# Patient Record
Sex: FEMALE | Race: WHITE | NOT HISPANIC OR LATINO | Employment: FULL TIME | RURAL
[De-identification: names, ages, dates, MRNs, and addresses within clinical notes are randomized per-mention and may not be internally consistent; named-entity substitution may affect disease eponyms.]

---

## 2022-06-28 ENCOUNTER — OFFICE VISIT (OUTPATIENT)
Dept: FAMILY MEDICINE CLINIC | Facility: CLINIC | Age: 37
End: 2022-06-28

## 2022-06-28 VITALS
HEIGHT: 63 IN | BODY MASS INDEX: 27.82 KG/M2 | DIASTOLIC BLOOD PRESSURE: 70 MMHG | WEIGHT: 157 LBS | SYSTOLIC BLOOD PRESSURE: 124 MMHG

## 2022-06-28 DIAGNOSIS — J01.10 ACUTE NON-RECURRENT FRONTAL SINUSITIS: Primary | ICD-10-CM

## 2022-06-28 LAB
EXPIRATION DATE: NORMAL
INTERNAL CONTROL: NORMAL
Lab: NORMAL
SARS-COV-2 AG UPPER RESP QL IA.RAPID: NOT DETECTED

## 2022-06-28 PROCEDURE — 87426 SARSCOV CORONAVIRUS AG IA: CPT | Performed by: STUDENT IN AN ORGANIZED HEALTH CARE EDUCATION/TRAINING PROGRAM

## 2022-06-28 PROCEDURE — 99203 OFFICE O/P NEW LOW 30 MIN: CPT | Performed by: STUDENT IN AN ORGANIZED HEALTH CARE EDUCATION/TRAINING PROGRAM

## 2022-06-28 RX ORDER — CEFDINIR 300 MG/1
300 CAPSULE ORAL 2 TIMES DAILY
Qty: 14 CAPSULE | Refills: 0 | Status: SHIPPED | OUTPATIENT
Start: 2022-06-28 | End: 2022-12-22

## 2022-06-28 NOTE — PROGRESS NOTES
"Chief Complaint  Earache (X6 days)    Subjective          April Jade Bonner presents to [facility] PRIMARY [facility]  URI   This is a new problem. The current episode started in the past 7 days. The problem has been gradually worsening. There has been no fever. Associated symptoms include congestion, ear pain, headaches, a plugged ear sensation, sinus pain and a sore throat. She has tried nothing for the symptoms. The treatment provided no relief.       Objective   Vital Signs:   /70 (BP Location: Left arm, Patient Position: Sitting, Cuff Size: Large Adult)   Ht 160 cm (63\")   Wt 71.2 kg (157 lb)   BMI 27.81 kg/m²     Body mass index is 27.81 kg/m².    Review of Systems   HENT: Positive for congestion, ear pain and sore throat.        Past History:  Medical History: has a past medical history of Anemia, Anxiety, and Neurotic depression.   Surgical History: has a past surgical history that includes Hernia repair; Cosmetic surgery; and  section.   Family History: family history includes Allergies in her father and mother; Asthma in her father and another family member; Cancer in an other family member; Depression in her mother and another family member; Diabetes in her father and another family member; Obesity in an other family member; Osteoarthritis in her father, mother, and another family member.   Social History: reports that she has never smoked. She does not have any smokeless tobacco history on file.      Current Outpatient Medications:   •  cefdinir (OMNICEF) 300 MG capsule, Take 1 capsule by mouth 2 (Two) Times a Day., Disp: 14 capsule, Rfl: 0    Allergies: Penicillins    Physical Exam  Constitutional:       General: She is not in acute distress.     Appearance: She is not ill-appearing or toxic-appearing.   HENT:      Head: Normocephalic and atraumatic.      Right Ear: Ear canal and external ear normal.      Left Ear: Ear canal and external ear normal.      Nose: " Congestion and rhinorrhea present.      Mouth/Throat:      Pharynx: Posterior oropharyngeal erythema (cobblestoning) present.   Eyes:      General: No scleral icterus.  Cardiovascular:      Rate and Rhythm: Normal rate and regular rhythm.   Pulmonary:      Effort: Pulmonary effort is normal.      Breath sounds: Normal breath sounds.   Neurological:      Mental Status: She is alert.          Result Review :                   Assessment and Plan    Diagnoses and all orders for this visit:    1. Acute non-recurrent frontal sinusitis (Primary)    Other orders  -     cefdinir (OMNICEF) 300 MG capsule; Take 1 capsule by mouth 2 (Two) Times a Day.  Dispense: 14 capsule; Refill: 0    Rapid COVID-negative.  We will treat with cefdinir.  Call with any new or worsening symptoms.  Continue Tylenol/Motrin for pain/fever. OTC cough and cold medication for symptoms. Nasal saline spray recommended. Cool mist humidifier at the bedside. RTC with new or worsening symptoms.        Follow Up   No follow-ups on file.  Patient was given instructions and counseling regarding her condition or for health maintenance advice. Please see specific information pulled into the AVS if appropriate.     Patti Loza, DO

## 2022-12-22 ENCOUNTER — OFFICE VISIT (OUTPATIENT)
Dept: FAMILY MEDICINE CLINIC | Facility: CLINIC | Age: 37
End: 2022-12-22

## 2022-12-22 VITALS
HEART RATE: 84 BPM | BODY MASS INDEX: 29.06 KG/M2 | OXYGEN SATURATION: 98 % | DIASTOLIC BLOOD PRESSURE: 66 MMHG | HEIGHT: 63 IN | SYSTOLIC BLOOD PRESSURE: 112 MMHG | WEIGHT: 164 LBS

## 2022-12-22 DIAGNOSIS — R05.1 ACUTE COUGH: ICD-10-CM

## 2022-12-22 DIAGNOSIS — J01.10 ACUTE NON-RECURRENT FRONTAL SINUSITIS: Primary | ICD-10-CM

## 2022-12-22 DIAGNOSIS — R09.89 CHEST CONGESTION: ICD-10-CM

## 2022-12-22 LAB
EXPIRATION DATE: NORMAL
EXPIRATION DATE: NORMAL
FLUAV AG UPPER RESP QL IA.RAPID: NOT DETECTED
FLUBV AG UPPER RESP QL IA.RAPID: NOT DETECTED
INTERNAL CONTROL: NORMAL
INTERNAL CONTROL: NORMAL
Lab: NORMAL
Lab: NORMAL
S PYO AG THROAT QL: NEGATIVE
SARS-COV-2 RNA RESP QL NAA+PROBE: NOT DETECTED

## 2022-12-22 PROCEDURE — 87428 SARSCOV & INF VIR A&B AG IA: CPT | Performed by: PHYSICIAN ASSISTANT

## 2022-12-22 PROCEDURE — 87880 STREP A ASSAY W/OPTIC: CPT | Performed by: PHYSICIAN ASSISTANT

## 2022-12-22 PROCEDURE — 99213 OFFICE O/P EST LOW 20 MIN: CPT | Performed by: PHYSICIAN ASSISTANT

## 2022-12-22 RX ORDER — FOLIC ACID 1 MG/1
1000 TABLET ORAL DAILY
COMMUNITY
Start: 2022-12-03

## 2022-12-22 RX ORDER — CITALOPRAM 20 MG/1
20 TABLET ORAL DAILY
COMMUNITY
Start: 2022-12-03

## 2022-12-22 RX ORDER — CEPHALEXIN 500 MG/1
500 CAPSULE ORAL 2 TIMES DAILY
Qty: 14 CAPSULE | Refills: 0 | Status: SHIPPED | OUTPATIENT
Start: 2022-12-22 | End: 2022-12-29

## 2022-12-22 RX ORDER — ALBUTEROL SULFATE 90 UG/1
2 AEROSOL, METERED RESPIRATORY (INHALATION) EVERY 4 HOURS PRN
Qty: 18 G | Refills: 1 | Status: SHIPPED | OUTPATIENT
Start: 2022-12-22

## 2022-12-22 NOTE — PROGRESS NOTES
"Chief Complaint  Cough (Cough, chest congestion been going on for a week )    Subjective        April Jade Bonner presents to [facility]  History of Present Illness  Patient states for a week she has had more cough and has been achy all over.  She states that her symptoms and improved.  She states she is no had no further nasal congestion.  She does however feel like the cough is also in her chest.  She said that hurts when she breathes.  She states she is had a lot of drainage mucus and headache.  She denies that she ever had a fever with this.  She is taking NyQuil and notes that the cough is not keeping her up at night.  She is also taking DayQuil.  She has never been diagnosed with asthma or given inhalers.      Objective   Vital Signs:  /66 (BP Location: Left arm, Patient Position: Sitting, Cuff Size: Large Adult)   Pulse 84   Ht 160 cm (63\")   Wt 74.4 kg (164 lb)   SpO2 98%   BMI 29.05 kg/m²   Estimated body mass index is 29.05 kg/m² as calculated from the following:    Height as of this encounter: 160 cm (63\").    Weight as of this encounter: 74.4 kg (164 lb).          Physical Exam  Constitutional:       Appearance: Normal appearance.   HENT:      Mouth/Throat:      Comments: Discolored drainage  Pulmonary:      Effort: Pulmonary effort is normal.      Comments: coarse  Neurological:      Mental Status: She is alert.        Result Review :     POC Rapid Strep A (12/22/2022 14:16)  Covid-19 + Flu A&B AG, Veritor (12/22/2022 14:15)              Assessment and Plan   Diagnoses and all orders for this visit:    1. Acute non-recurrent frontal sinusitis (Primary)  -     cephalexin (Keflex) 500 MG capsule; Take 1 capsule by mouth 2 (Two) Times a Day for 7 days.  Dispense: 14 capsule; Refill: 0  We will add Keflex twice a day for the next 7 days.  We encouraged her DayQuil or NyQuil to help with her symptoms.  2. Chest congestion  -     Covid-19 + Flu A&B AG, " Veritor  -     POC Rapid Strep A  We discussed with patient that her flu COVID and strep test were negative today.  3. Acute cough  -     albuterol sulfate  (90 Base) MCG/ACT inhaler; Inhale 2 puffs Every 4 (Four) Hours As Needed for Wheezing or Shortness of Air.  Dispense: 18 g; Refill: 1  We will add albuterol inhaler to use as needed for cough and heaviness in chest.  Discussed with patient that side effect of albuterol is to feel quite jittery.  She may rinse her mouth out after using albuterol to help decrease her symptoms.  If her symptoms worsen she is to give our office a call.           Follow Up   No follow-ups on file.  Patient was given instructions and counseling regarding her condition or for health maintenance advice. Please see specific information pulled into the AVS if appropriate.

## 2024-05-17 ENCOUNTER — OFFICE VISIT (OUTPATIENT)
Dept: FAMILY MEDICINE CLINIC | Facility: CLINIC | Age: 39
End: 2024-05-17
Payer: COMMERCIAL

## 2024-05-17 VITALS
HEIGHT: 63 IN | DIASTOLIC BLOOD PRESSURE: 74 MMHG | HEART RATE: 70 BPM | SYSTOLIC BLOOD PRESSURE: 108 MMHG | OXYGEN SATURATION: 98 % | BODY MASS INDEX: 31.36 KG/M2 | WEIGHT: 177 LBS

## 2024-05-17 DIAGNOSIS — R59.0 LYMPHADENOPATHY, AXILLARY: Primary | ICD-10-CM

## 2024-05-17 PROCEDURE — 99213 OFFICE O/P EST LOW 20 MIN: CPT | Performed by: STUDENT IN AN ORGANIZED HEALTH CARE EDUCATION/TRAINING PROGRAM

## 2024-05-17 RX ORDER — CEFDINIR 300 MG/1
300 CAPSULE ORAL 2 TIMES DAILY
Qty: 20 CAPSULE | Refills: 0 | Status: SHIPPED | OUTPATIENT
Start: 2024-05-17

## 2024-05-17 NOTE — PROGRESS NOTES
"Chief Complaint  Edema (X 3 days left arm pit)    Subjective          April Jade Bonner presents to [facility]  History of Present Illness    Patient presents the office today for evaluation of enlarged lymph nodes in her axilla bilaterally, worse on the left than the right.  Patient states that she noticed them about 3 days ago.  She has not changed anything including deodorants or soaps.  Patient states that she did recently have an abnormal mammogram which she is having followed up at [facility] by her OB/GYN at this time and was concerned that this could be related.        Objective   Vital Signs:   /74   Pulse 70   Ht 160 cm (63\")   Wt 80.3 kg (177 lb)   SpO2 98%   BMI 31.35 kg/m²     Body mass index is 31.35 kg/m².    Review of Systems    Past History:  Medical History: has a past medical history of Anemia, Anxiety, and Neurotic depression.   Surgical History: has a past surgical history that includes Hernia repair; Cosmetic surgery; and  section.   Family History: family history includes Allergies in her father and mother; Asthma in her father and another family member; Cancer in an other family member; Depression in her mother and another family member; Diabetes in her father and another family member; Obesity in an other family member; Osteoarthritis in her father, mother, and another family member.   Social History: reports that she has never smoked. She has never been exposed to tobacco smoke. She does not have any smokeless tobacco history on file. Alcohol use questions deferred to the physician. Drug use questions deferred to the physician.      Current Outpatient Medications:     cefdinir (OMNICEF) 300 MG capsule, Take 1 capsule by mouth 2 (Two) Times a Day., Disp: 20 capsule, Rfl: 0    citalopram (CeleXA) 20 MG tablet, Take 20 mg by mouth Daily., Disp: , Rfl:     folic acid (FOLVITE) 1 MG tablet, Take 1,000 mcg by mouth Daily., " Disp: , Rfl:     Allergies: Penicillins    Physical Exam  Constitutional:       General: She is not in acute distress.     Appearance: She is not ill-appearing or toxic-appearing.   HENT:      Head: Normocephalic and atraumatic.   Neck:      Comments: Patient with enlarged lymph nodes that are tender to palpation on the axillary area of both arms.  Left worse than right.  No overt fluctuance noted.  Cardiovascular:      Rate and Rhythm: Normal rate and regular rhythm.      Heart sounds: No murmur heard.  Pulmonary:      Effort: Pulmonary effort is normal. No respiratory distress.   Neurological:      General: No focal deficit present.      Mental Status: She is alert and oriented to person, place, and time.   Psychiatric:         Mood and Affect: Mood normal.         Thought Content: Thought content normal.          Result Review :                   Assessment and Plan    Diagnoses and all orders for this visit:    1. Lymphadenopathy, axillary (Primary)    Other orders  -     cefdinir (OMNICEF) 300 MG capsule; Take 1 capsule by mouth 2 (Two) Times a Day.  Dispense: 20 capsule; Refill: 0    Will treat with for lymphadenopathy with cefdinir for 10 days.  Advised that if patient does not have improvement in the 10 days of antibiotics that she contact the office and we will do ultrasound for further evaluation.  She is agreeable.        Follow Up   No follow-ups on file.  Patient was given instructions and counseling regarding her condition or for health maintenance advice. Please see specific information pulled into the AVS if appropriate.     Patti Loza, DO

## 2024-08-27 ENCOUNTER — TELEMEDICINE (OUTPATIENT)
Dept: FAMILY MEDICINE CLINIC | Facility: CLINIC | Age: 39
End: 2024-08-27
Payer: COMMERCIAL

## 2024-08-27 VITALS — BODY MASS INDEX: 31.01 KG/M2 | WEIGHT: 175 LBS | HEIGHT: 63 IN

## 2024-08-27 DIAGNOSIS — R21 RASH: Primary | ICD-10-CM

## 2024-08-27 PROCEDURE — 99213 OFFICE O/P EST LOW 20 MIN: CPT | Performed by: NURSE PRACTITIONER

## 2024-08-27 RX ORDER — TRIAMCINOLONE ACETONIDE 1 MG/G
1 CREAM TOPICAL 2 TIMES DAILY PRN
Qty: 80 G | Refills: 0 | Status: SHIPPED | OUTPATIENT
Start: 2024-08-27

## 2024-08-27 RX ORDER — PREDNISONE 20 MG/1
40 TABLET ORAL DAILY
Qty: 10 TABLET | Refills: 0 | Status: SHIPPED | OUTPATIENT
Start: 2024-08-27

## 2024-08-27 NOTE — ASSESSMENT & PLAN NOTE
Prednisone as prescribed.  Avoid NSAIDs while on prednisone.  Topical steroid cream as needed.  Keep clean with soap and water.  Risk of meds discussed understood.  Education provided.  Patient states no risk or chance of pregnancy.  Return in 2 days if no improvement, sooner if worsens.  Return to clinic or ED with any issues or concerns.

## 2024-08-27 NOTE — PROGRESS NOTES
"You have chosen to receive care through a telehealth visit.  Do you consent to use a video/audio connection for your medical care today? Yes     Patient was at home during this appointment and I was in the [place] office.    This was an audio and video enabled telemedicine encounter.     Chief Complaint  Insect Bite    Subjective          April Jade Bonner presents to [facility]  Insect Bite  Associated symptoms include a rash. Pertinent negatives include no abdominal pain, arthralgias, chest pain, chills, congestion, coughing, fatigue, fever, nausea, sore throat, swollen glands or vomiting.       Patient states a week ago she noticed some red itchy bumps present to her left thigh and left lateral chest area.  She is unsure if it could be bug bites.  States she has been using over-the-counter itch cream and thought it was improving but states over the past couple days the redness seems to be spreading.  No pain but states that each.  No discharge.  No fever no chills no body aches.    Objective   Vital Signs:   Ht 160 cm (63\")   Wt 79.4 kg (175 lb)   BMI 31.00 kg/m²     Body mass index is 31 kg/m².    Review of Systems   Constitutional:  Negative for chills, fatigue and fever.   HENT:  Negative for congestion, sore throat, swollen glands and trouble swallowing.    Respiratory:  Negative for cough, shortness of breath and wheezing.    Cardiovascular:  Negative for chest pain.   Gastrointestinal:  Negative for abdominal pain, diarrhea, nausea and vomiting.   Musculoskeletal:  Negative for arthralgias.   Skin:  Positive for rash.   Neurological:  Negative for headache.       Past History:  Medical History: has a past medical history of Anemia, Anxiety, and Neurotic depression.   Surgical History: has a past surgical history that includes Hernia repair; Cosmetic surgery; and  section.   Family History: family history includes Allergies in her father and mother; Asthma " in her father and another family member; Cancer in an other family member; Depression in her mother and another family member; Diabetes in her father and another family member; Obesity in an other family member; Osteoarthritis in her father, mother, and another family member.   Social History: reports that she has never smoked. She has never been exposed to tobacco smoke. She has never used smokeless tobacco. Alcohol use questions deferred to the physician. Drug use questions deferred to the physician.    PHQ-2 Depression Screening  Little interest or pleasure in doing things? 0-->not at all   Feeling down, depressed, or hopeless? 0-->not at all   PHQ-2 Total Score 0        PHQ-9 Depression Screening  Little interest or pleasure in doing things? 0-->not at all   Feeling down, depressed, or hopeless? 0-->not at all   Trouble falling or staying asleep, or sleeping too much?     Feeling tired or having little energy?     Poor appetite or overeating?     Feeling bad about yourself - or that you are a failure or have let yourself or your family down?     Trouble concentrating on things, such as reading the newspaper or watching television?     Moving or speaking so slowly that other people could have noticed? Or the opposite - being so fidgety or restless that you have been moving around a lot more than usual?     Thoughts that you would be better off dead, or of hurting yourself in some way?     PHQ-9 Total Score 0   If you checked off any problems, how difficult have these problems made it for you to do your work, take care of things at home, or get along with other people?       PHQ-9 Total Score: 0      Patient screened positive for depression based on a PHQ-9 score of 0 on 8/27/2024. Follow-up recommendations include:          Current Outpatient Medications:     citalopram (CeleXA) 20 MG tablet, Take 1 tablet by mouth Daily., Disp: , Rfl:     folic acid (FOLVITE) 1 MG tablet, Take 1 tablet by mouth Daily., Disp: ,  Rfl:     omeprazole (priLOSEC) 20 MG capsule, Take 1 capsule by mouth Every 12 (Twelve) Hours., Disp: , Rfl:     predniSONE (DELTASONE) 20 MG tablet, Take 2 tablets by mouth Daily., Disp: 10 tablet, Rfl: 0    triamcinolone (KENALOG) 0.1 % cream, Apply 1 Application topically to the appropriate area as directed 2 (Two) Times a Day As Needed for Rash or Irritation., Disp: 80 g, Rfl: 0   (Not in a hospital admission)     Allergies: Penicillins    Physical Exam  Constitutional:       Appearance: Normal appearance.   Pulmonary:      Effort: Pulmonary effort is normal.   Skin:            Comments: Patient is able to show me an area on her left upper lateral thigh that appears to be a flat slightly erythematous area.  Nontender.  No open skin.  Patient states no drainage.  Patient states it itches.   Neurological:      General: No focal deficit present.      Mental Status: She is alert and oriented to person, place, and time. Mental status is at baseline.   Psychiatric:         Mood and Affect: Mood normal.         Behavior: Behavior normal.         Thought Content: Thought content normal.         Judgment: Judgment normal.          Result Review :                   Assessment and Plan    Diagnoses and all orders for this visit:    1. Rash (Primary)  Assessment & Plan:  Prednisone as prescribed.  Avoid NSAIDs while on prednisone.  Topical steroid cream as needed.  Keep clean with soap and water.  Risk of meds discussed understood.  Education provided.  Patient states no risk or chance of pregnancy.  Return in 2 days if no improvement, sooner if worsens.  Return to clinic or ED with any issues or concerns.    Orders:  -     predniSONE (DELTASONE) 20 MG tablet; Take 2 tablets by mouth Daily.  Dispense: 10 tablet; Refill: 0  -     triamcinolone (KENALOG) 0.1 % cream; Apply 1 Application topically to the appropriate area as directed 2 (Two) Times a Day As Needed for Rash or Irritation.  Dispense: 80 g; Refill:  0              BMI is >= 30 and <35. (Class 1 Obesity). The following options were offered after discussion;: exercise counseling/recommendations and nutrition counseling/recommendations       Follow Up   Return if symptoms worsen or fail to improve.  Patient was given instructions and counseling regarding her condition or for health maintenance advice. Please see specific information pulled into the AVS if appropriate.     ALPA Boone

## 2025-05-02 ENCOUNTER — OFFICE VISIT (OUTPATIENT)
Dept: FAMILY MEDICINE CLINIC | Facility: CLINIC | Age: 40
End: 2025-05-02
Payer: COMMERCIAL

## 2025-05-02 VITALS
HEART RATE: 90 BPM | TEMPERATURE: 98.4 F | OXYGEN SATURATION: 98 % | BODY MASS INDEX: 33.49 KG/M2 | HEIGHT: 62 IN | SYSTOLIC BLOOD PRESSURE: 136 MMHG | WEIGHT: 182 LBS | DIASTOLIC BLOOD PRESSURE: 82 MMHG

## 2025-05-02 DIAGNOSIS — R21 RASH: Primary | ICD-10-CM
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
## 2025-05-02 DIAGNOSIS — S20.469A TICK BITE OF BACK WALL OF THORAX, UNSPECIFIED LOCATION, INITIAL ENCOUNTER: ICD-10-CM

## 2025-05-02 RX ORDER — PREDNISONE 20 MG/1
TABLET ORAL
Qty: 18 TABLET | Refills: 0 | Status: SHIPPED | OUTPATIENT
Start: 2025-05-02

## 2025-05-02 RX ORDER — DOXYCYCLINE 100 MG/1
100 CAPSULE ORAL 2 TIMES DAILY
Qty: 20 CAPSULE | Refills: 0 | Status: SHIPPED | OUTPATIENT
Start: 2025-05-02

## 2025-05-02 NOTE — PROGRESS NOTES
"Chief Complaint  Rash (Pt is here for a rash scattered body onset 8 days. )    Subjective          April Jade Bonner presents to [facility]  History of Present Illness  Pt pulled a tick from her low back and left foot 2 weeks ago after hiking. She began a scattered rash 1 week later. She has headaches at times but denies joint pain. She was given a topical steroid by a [facility] but continues to have new rash.   Rash  Associated symptoms: no fatigue, no fever and no shortness of breath        History of Present Illness      Objective   Vital Signs:   /82   Pulse 90   Temp 98.4 °F (36.9 °C) (Oral)   Ht 157.5 cm (62\")   Wt 82.6 kg (182 lb)   SpO2 98%   BMI 33.29 kg/m²     Body mass index is 33.29 kg/m².    Review of Systems   Constitutional:  Negative for fatigue and fever.   Respiratory:  Negative for shortness of breath.    Cardiovascular:  Negative for chest pain, palpitations and leg swelling.   Musculoskeletal:  Negative for arthralgias.   Skin:  Positive for rash.   Neurological:  Negative for syncope.   Psychiatric/Behavioral:  The patient is not nervous/anxious.           Current Outpatient Medications:     citalopram (CeleXA) 20 MG tablet, Take 1 tablet by mouth Daily., Disp: , Rfl:     folic acid (FOLVITE) 1 MG tablet, Take 1 tablet by mouth Daily., Disp: , Rfl:     omeprazole (priLOSEC) 20 MG capsule, Take 1 capsule by mouth Every 12 (Twelve) Hours., Disp: , Rfl:     triamcinolone (KENALOG) 0.1 % cream, Apply 1 Application topically to the appropriate area as directed 2 (Two) Times a Day As Needed for Rash or Irritation., Disp: 80 g, Rfl: 0    doxycycline (VIBRAMYCIN) 100 MG capsule, Take 1 capsule by mouth 2 (Two) Times a Day., Disp: 20 capsule, Rfl: 0    predniSONE (DELTASONE) 20 MG tablet, 3 QD x 3 days, 2 QD x 3 days, 1 QD x 3 days, Disp: 18 tablet, Rfl: 0      Allergies: Penicillins    Physical Exam  Constitutional:       Appearance: Normal appearance. "   HENT:      Head: Normocephalic.   Eyes:      Conjunctiva/sclera: Conjunctivae normal.      Pupils: Pupils are equal, round, and reactive to light.   Cardiovascular:      Rate and Rhythm: Normal rate and regular rhythm.      Heart sounds: Normal heart sounds.   Pulmonary:      Effort: Pulmonary effort is normal.      Breath sounds: Normal breath sounds.   Abdominal:      Tenderness: There is no abdominal tenderness.   Musculoskeletal:         General: Normal range of motion.   Skin:     General: Skin is warm and dry.      Capillary Refill: Capillary refill takes less than 2 seconds.      Comments: Scattered papular rash on body. Few areas of scaling rash on left torso   Neurological:      General: No focal deficit present.      Mental Status: She is alert and oriented to person, place, and time.   Psychiatric:         Mood and Affect: Mood normal.         Behavior: Behavior normal.         Thought Content: Thought content normal.         Judgment: Judgment normal.          Physical Exam         Result Review :                Results            Assessment and Plan    Diagnoses and all orders for this visit:    1. Rash (Primary)  Comments:  Finish prednisone and may apply topical steroid. Benadryl PRN itching. RTC for worsened sx and if not improving in 1 week.  Orders:  -     Cancel: Lyme Disease Total Antibody With Reflex to Immunoassay  -     Cancel: Ehrlichia Profile DNA PCR  -     Cancel: Rickettsia Species DNA, Real-Time PCR  -     predniSONE (DELTASONE) 20 MG tablet; 3 QD x 3 days, 2 QD x 3 days, 1 QD x 3 days  Dispense: 18 tablet; Refill: 0  -     Alpha-Gal IgE Panel  -     Lyme Disease Total Antibody With Reflex to Immunoassay  -     Ehrlichia Profile DNA PCR  -     Rickettsia Species DNA, Real-Time PCR    2. Tick bite of back wall of thorax, unspecified location, initial encounter  Comments:  Labs drawn. Cover with Doxy while labs pending. Go to ER for worsened sx.  Orders:  -     doxycycline (VIBRAMYCIN)  100 MG capsule; Take 1 capsule by mouth 2 (Two) Times a Day.  Dispense: 20 capsule; Refill: 0                  Follow Up   Return in about 1 week (around 5/9/2025) for if not improving or sooner if symptoms worsen.  Patient was given instructions and counseling regarding her condition or for health maintenance advice. Please see specific information pulled into the AVS if appropriate.     ALPA Polk

## 2025-05-08 ENCOUNTER — TELEPHONE (OUTPATIENT)
Dept: FAMILY MEDICINE CLINIC | Facility: CLINIC | Age: 40
End: 2025-05-08
Payer: COMMERCIAL

## 2025-05-08 LAB
A PHAGOCYTOPH DNA BLD QL NAA+PROBE: NEGATIVE
ALPHA-GAL IGE QN: <0.1 KU/L
B BURGDOR IGG+IGM SER QL IA: NEGATIVE
BEEF IGE QN: <0.1 KU/L
CONV CLASS DESCRIPTION: NORMAL
EHRLICHIA DNA SPEC QL NAA+PROBE: NEGATIVE
IGE SERPL-ACNC: 70 IU/ML (ref 6–495)
LAMB IGE QN: <0.1 KU/L
PORK IGE QN: <0.1 KU/L
RICKETTSIA RICKETTSII DNA, RT: NOT DETECTED

## 2025-05-08 NOTE — TELEPHONE ENCOUNTER
Caller: Ailyn Bonner    Relationship: Self    Best call back number: [phone]    Caller requesting test results: YES    What test was performed: BLOOD WORK     When was the test performed: 5/2/2025    Where was the test performed: IN OFFICE     Additional notes: PLEASE CALL PATIENT WITH RESULTS.

## 2025-05-09 NOTE — TELEPHONE ENCOUNTER
Pt contacted told her crystal is out until Monday but from what I can tell it looks normal. Pt understood.

## 2025-05-12 NOTE — TELEPHONE ENCOUNTER
[facility]. Can you see if she has seen this yet and read to her if not? Thanks!

## 2025-05-29 ENCOUNTER — OFFICE VISIT (OUTPATIENT)
Dept: FAMILY MEDICINE CLINIC | Facility: CLINIC | Age: 40
End: 2025-05-29
Payer: COMMERCIAL

## 2025-05-29 VITALS
DIASTOLIC BLOOD PRESSURE: 76 MMHG | WEIGHT: 186 LBS | SYSTOLIC BLOOD PRESSURE: 140 MMHG | HEIGHT: 62 IN | OXYGEN SATURATION: 98 % | HEART RATE: 78 BPM | BODY MASS INDEX: 34.23 KG/M2

## 2025-05-29 DIAGNOSIS — Z11.59 ENCOUNTER FOR HEPATITIS C SCREENING TEST FOR LOW RISK PATIENT: ICD-10-CM

## 2025-05-29 DIAGNOSIS — R53.83 OTHER FATIGUE: ICD-10-CM

## 2025-05-29 DIAGNOSIS — E53.8 B12 DEFICIENCY: ICD-10-CM

## 2025-05-29 DIAGNOSIS — R40.0 DAYTIME SOMNOLENCE: ICD-10-CM

## 2025-05-29 DIAGNOSIS — Z00.00 WELL ADULT EXAM: Primary | ICD-10-CM

## 2025-05-29 DIAGNOSIS — E55.9 VITAMIN D DEFICIENCY: ICD-10-CM

## 2025-05-29 NOTE — PROGRESS NOTES
"Chief Complaint  Annual Exam    Subjective          April Jade Bonner presents to [facility]  History of Present Illness    Patient is here for physical.     She states that she has trouble with more fatigue and bloating. She states that she has been having more trouble with this over the past few months. She states that She states that she has gastroparesis, and states that she has been doing fiber to help keep her regular. She states that she has been having more trouble with this and weight gain despite her being significantly more active then previous.     [facility].     She states that she is oterwise doing well at this time.     She is due for labs.     Objective   Vital Signs:   /76   Pulse 78   Ht 157.5 cm (62\")   Wt 84.4 kg (186 lb)   SpO2 98%   BMI 34.02 kg/m²     Body mass index is 34.02 kg/m².    Review of Systems    Past History:  Medical History: has a past medical history of Anemia, Anxiety, and Neurotic depression.   Surgical History: has a past surgical history that includes Hernia repair; Cosmetic surgery; and  section.   Family History: family history includes Allergies in her father and mother; Asthma in her father and another family member; Cancer in an other family member; Depression in her mother and another family member; Diabetes in her father and another family member; Obesity in an other family member; Osteoarthritis in her father, mother, and another family member.   Social History: reports that she has never smoked. She has never been exposed to tobacco smoke. She has never used smokeless tobacco. She reports that she does not currently use alcohol. She reports that she does not use drugs.      Current Outpatient Medications:     vitamin D3 125 MCG (5000 UT) capsule capsule, Take 1 capsule by mouth Daily., Disp: , Rfl:     citalopram (CeleXA) 20 MG tablet, Take 1 tablet by mouth " Daily., Disp: , Rfl:     folic acid (FOLVITE) 1 MG tablet, Take 1 tablet by mouth Daily., Disp: , Rfl:     omeprazole (priLOSEC) 20 MG capsule, Take 1 capsule by mouth Every 12 (Twelve) Hours., Disp: , Rfl:     Allergies: Penicillins    Physical Exam  Constitutional:       General: She is not in acute distress.     Appearance: She is not ill-appearing or toxic-appearing.   HENT:      Head: Normocephalic and atraumatic.   Cardiovascular:      Rate and Rhythm: Normal rate and regular rhythm.      Heart sounds: No murmur heard.  Pulmonary:      Effort: Pulmonary effort is normal. No respiratory distress.   Abdominal:      Tenderness: There is no abdominal tenderness. There is no guarding or rebound.   Musculoskeletal:      Right lower leg: No edema.      Left lower leg: No edema.   Neurological:      General: No focal deficit present.      Mental Status: She is alert and oriented to person, place, and time.   Psychiatric:         Mood and Affect: Mood normal.         Thought Content: Thought content normal.          Result Review :                   Assessment and Plan    Diagnoses and all orders for this visit:    1. Well adult exam (Primary)  -     Comprehensive Metabolic Panel  -     CBC & Differential  -     Hemoglobin A1c  -     Lipid Panel  -     TSH  -     T4, Free    2. Other fatigue  -     Vitamin D,25-Hydroxy  -     Magnesium  -     Estrogens, Total  -     Progesterone  -     FSH & LH    3. B12 deficiency  -     Vitamin B12 & Folate    4. Vitamin D deficiency  -     Vitamin D,25-Hydroxy    5. Encounter for hepatitis C screening test for low risk patient  -     Hepatitis C Antibody    6. Daytime somnolence  -     Ambulatory Referral to Sleep Medicine    Other orders  -     Tdap Vaccine => 6yo IM (BOOSTRIX/ADACEL)    Blood work ordered and will contact with results when available. Will adjust medications based on abnormalities seen.     Will do Tdap vaccine at this time.     Eval for perimenopause vs menopause.      Past medical and surgical history as well as allergies, family history and social history were reviewed, and discussed with patient.  Chronic conditions were reviewed as well as medications.   Anticipatory guidance handouts including healthy diet, health maintenance, as well as regular exercise and general instructions were given via [employer], and patient was able to ask questions and discuss any concerns.        Follow Up   No follow-ups on file.  Patient was given instructions and counseling regarding her condition or for health maintenance advice. Please see specific information pulled into the AVS if appropriate.     Patti Loza, DO

## 2025-06-03 LAB
25(OH)D3+25(OH)D2 SERPL-MCNC: 58.2 NG/ML (ref 30–100)
ALBUMIN SERPL-MCNC: 4.6 G/DL (ref 3.9–4.9)
ALP SERPL-CCNC: 66 IU/L (ref 44–121)
ALT SERPL-CCNC: 21 IU/L (ref 0–32)
AST SERPL-CCNC: 23 IU/L (ref 0–40)
BASOPHILS # BLD AUTO: 0.1 X10E3/UL (ref 0–0.2)
BASOPHILS NFR BLD AUTO: 1 %
BILIRUB SERPL-MCNC: 0.8 MG/DL (ref 0–1.2)
BUN SERPL-MCNC: 11 MG/DL (ref 6–24)
BUN/CREAT SERPL: 14 (ref 9–23)
CALCIUM SERPL-MCNC: 9.1 MG/DL (ref 8.7–10.2)
CHLORIDE SERPL-SCNC: 101 MMOL/L (ref 96–106)
CHOLEST SERPL-MCNC: 214 MG/DL (ref 100–199)
CO2 SERPL-SCNC: 22 MMOL/L (ref 20–29)
CREAT SERPL-MCNC: 0.79 MG/DL (ref 0.57–1)
EGFRCR SERPLBLD CKD-EPI 2021: 96 ML/MIN/1.73
EOSINOPHIL # BLD AUTO: 0.4 X10E3/UL (ref 0–0.4)
EOSINOPHIL NFR BLD AUTO: 6 %
ERYTHROCYTE [DISTWIDTH] IN BLOOD BY AUTOMATED COUNT: 13.7 % (ref 11.7–15.4)
ESTROGEN SERPL-MCNC: 165 PG/ML
FOLATE SERPL-MCNC: >20 NG/ML
FSH SERPL-ACNC: 3.6 MIU/ML
GLOBULIN SER CALC-MCNC: 2.5 G/DL (ref 1.5–4.5)
GLUCOSE SERPL-MCNC: 79 MG/DL (ref 70–99)
HBA1C MFR BLD: 5.2 % (ref 4.8–5.6)
HCT VFR BLD AUTO: 40.2 % (ref 34–46.6)
HCV IGG SERPL QL IA: NON REACTIVE
HDLC SERPL-MCNC: 51 MG/DL
HGB BLD-MCNC: 13.7 G/DL (ref 11.1–15.9)
IMM GRANULOCYTES # BLD AUTO: 0 X10E3/UL (ref 0–0.1)
IMM GRANULOCYTES NFR BLD AUTO: 1 %
LDLC SERPL CALC-MCNC: 126 MG/DL (ref 0–99)
LH SERPL-ACNC: 6.8 MIU/ML
LYMPHOCYTES # BLD AUTO: 1.9 X10E3/UL (ref 0.7–3.1)
LYMPHOCYTES NFR BLD AUTO: 33 %
MAGNESIUM SERPL-MCNC: 2.1 MG/DL (ref 1.6–2.3)
MCH RBC QN AUTO: 33.3 PG (ref 26.6–33)
MCHC RBC AUTO-ENTMCNC: 34.1 G/DL (ref 31.5–35.7)
MCV RBC AUTO: 98 FL (ref 79–97)
MONOCYTES # BLD AUTO: 0.3 X10E3/UL (ref 0.1–0.9)
MONOCYTES NFR BLD AUTO: 6 %
NEUTROPHILS # BLD AUTO: 3.1 X10E3/UL (ref 1.4–7)
NEUTROPHILS NFR BLD AUTO: 53 %
PLATELET # BLD AUTO: 238 X10E3/UL (ref 150–450)
POTASSIUM SERPL-SCNC: 4 MMOL/L (ref 3.5–5.2)
PROGEST SERPL-MCNC: 0.1 NG/ML
PROT SERPL-MCNC: 7.1 G/DL (ref 6–8.5)
RBC # BLD AUTO: 4.11 X10E6/UL (ref 3.77–5.28)
SODIUM SERPL-SCNC: 139 MMOL/L (ref 134–144)
T4 FREE SERPL-MCNC: 1.08 NG/DL (ref 0.82–1.77)
TRIGL SERPL-MCNC: 211 MG/DL (ref 0–149)
TSH SERPL DL<=0.005 MIU/L-ACNC: 1.1 UIU/ML (ref 0.45–4.5)
VIT B12 SERPL-MCNC: 559 PG/ML (ref 232–1245)
VLDLC SERPL CALC-MCNC: 37 MG/DL (ref 5–40)
WBC # BLD AUTO: 5.8 X10E3/UL (ref 3.4–10.8)

## 2025-07-18 ENCOUNTER — OFFICE VISIT (OUTPATIENT)
Age: 40
End: 2025-07-18
Payer: COMMERCIAL

## 2025-07-18 VITALS
WEIGHT: 187 LBS | HEART RATE: 69 BPM | BODY MASS INDEX: 34.41 KG/M2 | HEIGHT: 62 IN | OXYGEN SATURATION: 99 % | SYSTOLIC BLOOD PRESSURE: 140 MMHG | DIASTOLIC BLOOD PRESSURE: 85 MMHG

## 2025-07-18 DIAGNOSIS — R06.83 LOUD SNORING: ICD-10-CM

## 2025-07-18 DIAGNOSIS — G47.10 HYPERSOMNIA: ICD-10-CM

## 2025-07-19 NOTE — PROGRESS NOTES
Date:   2025   Name: Ailyn Bonner  :   1985  PCP: Patti Loza DO  REF:    Patti Loza DO     Sleep and/or Cardiology Consulting Provider Note         ..Establish Care (NECK SIZE: 14in)         History of Present Illness  The patient is a 40-year-old female who presents today to establish sleep care for possible obstructive sleep apnea (MAGO).  She was referred by her PCP, Dr. Loza, and I appreciate the referral.    She has been in a relationship with her boyfriend for nearly 2 years, during which time he has observed episodes of her not breathing during sleep. She was previously unaware of these occurrences. Lifelong snoring has been reported, which intensifies during allergy seasons. The severity of her snoring varies, with some nights being mild and others resembling the sound of a lawnmower or chainsaw. Constant fatigue is experienced throughout the day.    She consumes 1 to 2 servings of caffeine daily, either in the form of coffee or tea, but not both, and avoids caffeine after 5 PM due to its impact on her sleep. A regular exercise routine is maintained, working out 3 to 4 times a week in her home gym. There is a history of sleepwalking as a child but not as an adult. Teeth grinding and nightmares are also experienced. A mouth guard was used in the past but was lost and has not been replaced due to cost.    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  Exercise: Uses a gym in the basement with an elliptical, treadmill, and weights, exercises 3-4 times a week.  Diet: Two meals a day.  Alcohol: Consumes 1 to 2 alcoholic drinks a month.  Tobacco: Does not smoke.  Recreational Drugs: No history of drug use.  Coffee/Tea/Caffeine-containing Drinks: Consumes 1 to 2 servings of caffeine a day, either coffee or tea, but not in addition to soda.    FAMILY  HISTORY  - Father: Sleep apnea, prescribed CPAP machine but does not use it.    Newton Scale is (out of 24): Total score: 10       The 10-year ASCVD risk score (Dariel MA, et al., 2019) is: 1%    Values used to calculate the score:      Age: 40 years      Sex: Female      Is Non- : No      Diabetic: No      Tobacco smoker: No      Systolic Blood Pressure: 140 mmHg      Is BP treated: No      HDL Cholesterol: 51 mg/dL      Total Cholesterol: 214 mg/dL     Cardiology and Sleep Related History:      No specialty comments available.    There are no discontinued medications.     Allergies   Allergen Reactions    Penicillins Dermatitis, Hives, Itching, Rash and Swelling         Current Outpatient Medications:     citalopram (CeleXA) 20 MG tablet, Take 1 tablet by mouth Daily., Disp: , Rfl:     folic acid (FOLVITE) 1 MG tablet, Take 1 tablet by mouth Daily., Disp: , Rfl:     omeprazole (priLOSEC) 20 MG capsule, Take 1 capsule by mouth Every 12 (Twelve) Hours., Disp: , Rfl:     vitamin D3 125 MCG (5000 UT) capsule capsule, Take 1 capsule by mouth Daily., Disp: , Rfl:     Past Medical History:   Diagnosis Date    Anemia     Anxiety     Neurotic depression           Patient Active Problem List   Diagnosis    Rash    Loud snoring    Hypersomnia        Family History   Problem Relation Age of Onset    Depression Mother     Allergies Mother     Osteoarthritis Mother     Allergies Father     Diabetes Father     Asthma Father     Osteoarthritis Father     Osteoarthritis Other     Depression Other     Asthma Other     Obesity Other     Diabetes Other     Cancer Other          family history includes Allergies in her father and mother; Asthma in her father and another family member; Cancer in an other family member; Depression in her mother and another family member; Diabetes in her father and another family member; Obesity in an other family member; Osteoarthritis in her father, mother, and another family  "member.     Social History     Socioeconomic History    Marital status: Legally    Tobacco Use    Smoking status: Never     Passive exposure: Never    Smokeless tobacco: Never   Vaping Use    Vaping status: Never Used   Substance and Sexual Activity    Alcohol use: Not Currently    Drug use: Never    Sexual activity: Yes     Partners: Male     Birth control/protection: Tubal ligation, Vasectomy             Vital Signs:  /85 (BP Location: Right arm, Patient Position: Sitting, Cuff Size: Large Adult)   Pulse 69   Ht 157.5 cm (62\")   Wt 84.8 kg (187 lb)   SpO2 99%   BMI 34.20 kg/m²   Estimated body mass index is 34.2 kg/m² as calculated from the following:    Height as of this encounter: 157.5 cm (62\").    Weight as of this encounter: 84.8 kg (187 lb).  Physical Exam  Vitals reviewed.   Constitutional:       General: She is not in acute distress.  HENT:      Head: Normocephalic.   Eyes:      General: No scleral icterus.  Cardiovascular:      Rate and Rhythm: Normal rate.   Pulmonary:      Effort: Pulmonary effort is normal.   Musculoskeletal:         General: Normal range of motion.      Cervical back: Normal range of motion.   Skin:     General: Skin is warm and dry.   Neurological:      Mental Status: She is alert and oriented to person, place, and time.   Psychiatric:         Mood and Affect: Mood normal.         Thought Content: Thought content normal.         Physical Exam      Results            Assessment and Plan     Diagnoses and all orders for this visit:    1. Hypersomnia  -     Home Sleep Study; Future    2. Loud snoring  -     Home Sleep Study; Future        Assessment & Plan  1. Snoring:  - Initiate a home sleep study to confirm the diagnosis.  - Follow the directions provided in the box, including scanning the QR code to download the kasie and following the instructions.  - Ensure at least 4 hours of sleep, preferably 6-8 hours, during the study.  - If the patient has to get up during " the night, return to bed as soon as possible.  - Confirm the results are sent via the kasie the next morning.  - Check the box for instructions on disposal of the equipment after use.  - Contact the office if the sleep study kit is not received within a week or if results are not received within 7 to 10 days post-test.  - Discuss treatment options based on the severity and type of sleep apnea if diagnosed.  - Educate on the risks associated with untreated sleep apnea, including increased risk of dementia, coronary artery disease, motor vehicle accidents, and potential mortality.    2.  Hypersomnia:  -  Check HST    Recommendations: ER if symptoms increase, Report if any new/changing symptoms immediately, and Sleep hygiene discussed    Follow Up  Return for After HST.    Patient or patient representative verbalized consent for the use of Ambient Listening during the visit with  ALPA Ferro for chart documentation. 7/18/2025  21:00 EDT    ALPA Ferro   07/18/2025     Please note that this explicitly excludes time spent on other separate billable services such as performing procedures or test interpretation, when applicable.  This note was created using dictation software which occasionally transcribes nonsensical phrases. Please contact the provider if any clarification is needed.

## 2025-07-30 DIAGNOSIS — G47.10 HYPERSOMNIA: ICD-10-CM

## 2025-07-30 DIAGNOSIS — R06.83 LOUD SNORING: ICD-10-CM

## 2025-07-31 DIAGNOSIS — R06.83 LOUD SNORING: Primary | ICD-10-CM

## 2025-07-31 DIAGNOSIS — G47.10 HYPERSOMNIA: ICD-10-CM

## 2025-08-10 ENCOUNTER — PATIENT MESSAGE (OUTPATIENT)
Dept: FAMILY MEDICINE CLINIC | Facility: CLINIC | Age: 40
End: 2025-08-10
Payer: COMMERCIAL

## 2025-08-13 ENCOUNTER — OFFICE VISIT (OUTPATIENT)
Dept: FAMILY MEDICINE CLINIC | Facility: CLINIC | Age: 40
End: 2025-08-13
Payer: COMMERCIAL

## 2025-08-13 VITALS
DIASTOLIC BLOOD PRESSURE: 74 MMHG | WEIGHT: 182 LBS | SYSTOLIC BLOOD PRESSURE: 128 MMHG | HEIGHT: 62 IN | BODY MASS INDEX: 33.49 KG/M2

## 2025-08-13 DIAGNOSIS — L50.9 URTICARIA: ICD-10-CM

## 2025-08-13 DIAGNOSIS — T78.40XA ALLERGIC REACTION, INITIAL ENCOUNTER: Primary | ICD-10-CM

## 2025-08-13 DIAGNOSIS — L50.9 HIVES: ICD-10-CM

## 2025-08-13 RX ORDER — PREDNISONE 10 MG/1
40 TABLET ORAL DAILY
Qty: 20 TABLET | Refills: 0 | Status: SHIPPED | OUTPATIENT
Start: 2025-08-13 | End: 2025-08-18

## 2025-08-13 RX ORDER — TRIAMCINOLONE ACETONIDE 1 MG/G
1 OINTMENT TOPICAL 2 TIMES DAILY
Qty: 80 G | Refills: 1 | Status: SHIPPED | OUTPATIENT
Start: 2025-08-13

## 2025-08-13 RX ORDER — TRIAMCINOLONE ACETONIDE 40 MG/ML
80 INJECTION, SUSPENSION INTRA-ARTICULAR; INTRAMUSCULAR ONCE
Status: COMPLETED | OUTPATIENT
Start: 2025-08-13 | End: 2025-08-13

## 2025-08-13 RX ADMIN — TRIAMCINOLONE ACETONIDE 80 MG: 40 INJECTION, SUSPENSION INTRA-ARTICULAR; INTRAMUSCULAR at 15:29

## 2025-08-19 ENCOUNTER — PATIENT MESSAGE (OUTPATIENT)
Dept: FAMILY MEDICINE CLINIC | Facility: CLINIC | Age: 40
End: 2025-08-19
Payer: COMMERCIAL